# Patient Record
Sex: FEMALE | Race: WHITE | URBAN - METROPOLITAN AREA
[De-identification: names, ages, dates, MRNs, and addresses within clinical notes are randomized per-mention and may not be internally consistent; named-entity substitution may affect disease eponyms.]

---

## 2017-07-12 ENCOUNTER — OFFICE VISIT (OUTPATIENT)
Dept: SLEEP MEDICINE | Facility: CLINIC | Age: 27
End: 2017-07-12
Payer: COMMERCIAL

## 2017-07-12 ENCOUNTER — HOSPITAL ENCOUNTER (OUTPATIENT)
Dept: LAB | Facility: CLINIC | Age: 27
Discharge: HOME | End: 2017-07-12
Attending: PSYCHIATRY & NEUROLOGY | Admitting: PSYCHIATRY & NEUROLOGY
Payer: COMMERCIAL

## 2017-07-12 VITALS
DIASTOLIC BLOOD PRESSURE: 76 MMHG | HEART RATE: 82 BPM | OXYGEN SATURATION: 95 % | SYSTOLIC BLOOD PRESSURE: 115 MMHG | BODY MASS INDEX: 24.09 KG/M2 | HEIGHT: 65 IN | WEIGHT: 144.6 LBS | TEMPERATURE: 98.2 F

## 2017-07-12 DIAGNOSIS — G47.50 PARASOMNIA: ICD-10-CM

## 2017-07-12 DIAGNOSIS — G25.81 RESTLESS LEGS SYNDROME (RLS): ICD-10-CM

## 2017-07-12 DIAGNOSIS — R06.81 CENTRAL APNEA: Primary | ICD-10-CM

## 2017-07-12 LAB — FERRITIN SERPL-MCNC: 25 NG/ML (ref 12–150)

## 2017-07-12 PROCEDURE — 99204 OFFICE O/P NEW MOD 45 MIN: CPT | Performed by: PSYCHIATRY & NEUROLOGY

## 2017-07-12 PROCEDURE — 36415 COLL VENOUS BLD VENIPUNCTURE: CPT | Performed by: PSYCHIATRY & NEUROLOGY

## 2017-07-12 PROCEDURE — 82728 ASSAY OF FERRITIN: CPT | Performed by: PSYCHIATRY & NEUROLOGY

## 2017-07-12 RX ORDER — DROSPIRENONE AND ETHINYL ESTRADIOL 0.03MG-3MG
KIT ORAL
COMMUNITY
Start: 2017-05-07

## 2017-07-12 RX ORDER — METHADONE HCL 100 %
POWDER (GRAM) MISCELLANEOUS
COMMUNITY
Start: 2017-07-05

## 2017-07-12 NOTE — MR AVS SNAPSHOT
After Visit Summary   7/12/2017    Kayla Mitchell    MRN: [id number]           Patient Information     Date Of Birth          1990        Visit Information        Provider Department      7/12/2017 8:00 AM Jose Manuel Gant MD [facility]        Today's Diagnoses     Central apnea    -  1    Parasomnia        Restless legs syndrome (RLS)          Care Instructions      Your BMI is Body mass index is 24.06 kg/(m^2).  Weight management is a personal decision.  If you are interested in exploring weight loss strategies, the following discussion covers the approaches that may be successful. Body mass index (BMI) is one way to tell whether you are at a healthy weight, overweight, or obese. It measures your weight in relation to your height.  A BMI of 18.5 to 24.9 is in the healthy range. A person with a BMI of 25 to 29.9 is considered overweight, and someone with a BMI of 30 or greater is considered obese. More than two-thirds of American adults are considered overweight or obese.  Being overweight or obese increases the risk for further weight gain. Excess weight may lead to heart disease and diabetes.  Creating and following plans for healthy eating and physical activity may help you improve your health.  Weight control is part of healthy lifestyle and includes exercise, emotional health, and healthy eating habits. Careful eating habits lifelong are the mainstay of weight control. Though there are significant health benefits from weight loss, long-term weight loss with diet alone may be very difficult to achieve- studies show long-term success with dietary management in less than 10% of people. Attaining a healthy weight may be especially difficult to achieve in those with severe obesity. In some cases, medications, devices and surgical management might be considered.  What can you do?  If you are overweight or obese and are interested in methods for weight loss, you should discuss  this with your provider.     Consider reducing daily calorie intake by 500 calories.     Keep a food journal.     Avoiding skipping meals, consider cutting portions instead.    Diet combined with exercise helps maintain muscle while optimizing fat loss. Strength training is particularly important for building and maintaining muscle mass. Exercise helps reduce stress, increase energy, and improves fitness. Increasing exercise without diet control, however, may not burn enough calories to loose weight.       Start walking three days a week 10-20 minutes at a time    Work towards walking thirty minutes five days a week     Eventually, increase the speed of your walking for 1-2 minutes at time    In addition, we recommend that you review healthy lifestyles and methods for weight loss available through the National Institutes of Health patient information sites:  http://win.niddk.nih.gov/publications/index.htm    And look into health and wellness programs that may be available through your health insurance provider, employer, local community center, or brittany club.                  Follow-ups after your visit        Your next 10 appointments already scheduled     Jul 13, 2017  8:30 PM CDT   PSG Split with BED 6 SH SLEEP   [facility])    [address] [address]   [phone]            Jul 31, 2017  1:30 PM CDT   Return Sleep Patient with Jose Manuel Gant MD   [facility])    [address] [address]   [phone]              Future tests that were ordered for you today     Open Future Orders        Priority Expected Expires Ordered    Ferritin Routine  9/10/2017 7/12/2017    Comprehensive Sleep Study Routine  1/8/2018 7/12/2017            Who to contact     If you have questions or need follow up information about today's clinic visit or your schedule please  "contact [facility] directly at [phone].  Normal or non-critical lab and imaging results will be communicated to you by MyChart, letter or phone within 4 business days after the clinic has received the results. If you do not hear from us within 7 days, please contact the clinic through MyChart or phone. If you have a critical or abnormal lab result, we will notify you by phone as soon as possible.  Submit refill requests through [employer] or call your pharmacy and they will forward the refill request to us. Please allow 3 business days for your refill to be completed.          Additional Information About Your Visit        [employer] Information     [employer] lets you send messages to your doctor, view your test results, renew your prescriptions, schedule appointments and more. To sign up, go to [email or web address] . Click on \"Log in\" on the left side of the screen, which will take you to the Welcome page. Then click on \"Sign up Now\" on the right side of the page.     You will be asked to enter the access code listed below, as well as some personal information. Please follow the directions to create your username and password.     Your access code is: [email or web address]  Expires: 10/10/2017  8:50 AM     Your access code will  in 90 days. If you need help or a new code, please call your [place] clinic or [phone].        Care EveryWhere ID     This is your Care EveryWhere ID. This could be used by other organizations to access your [place] medical records  [id number]        Your Vitals Were     Pulse Temperature Height Pulse Oximetry BMI (Body Mass Index)       82 98.2  F (36.8  C) (Oral) 1.651 m (5' 5\") 95% 24.06 kg/m2        Blood Pressure from Last 3 Encounters:   17 115/76   16 94/60   16 124/85    Weight from Last 3 Encounters:   17 65.6 kg (144 lb 9.6 oz)   16 62.2 kg (137 lb 3.2 oz)   16 59 kg (130 lb)                 Today's Medication Changes    "       These changes are accurate as of: 7/12/17  8:50 AM.  If you have any questions, ask your nurse or doctor.               These medicines have changed or have updated prescriptions.        Dose/Directions    Methadone HCl Powd   This may have changed:  Another medication with the same name was removed. Continue taking this medication, and follow the directions you see here.        Take 145 mg/ml daily.   Refills:  0       JAMAR 3-0.03 MG per tablet   This may have changed:  Another medication with the same name was removed. Continue taking this medication, and follow the directions you see here.   Generic drug:  drospirenone-ethinyl estradiol        TK 1 T PO QD   Refills:  0         Stop taking these medicines if you haven't already. Please contact your care team if you have questions.     ADDERALL PO           ALLEGRA PO           cloNIDine 0.1 MG/24HR WK patch   Commonly known as:  CATAPRES-TTS1           lidocaine 5 % Patch   Commonly known as:  LIDODERM           pregabalin 50 MG capsule   Commonly known as:  LYRICA           TIZANIDINE HCL PO                    Primary Care Provider Office Phone # Fax #    Estela Van [phone]       [facility] [address] [facility] [address]   [facility] [address]        Equal Access to Services     [facility] : Hadii aad ku hadasho Soomaali, waaxda luqadaha, qaybta kaalmada adeegyada, waxay dennis etienne. So [facility] [phone].    ATENCIÓN: Si habla español, tiene a bhat disposición servicios gratuitos de asistencia lingüística. Nannette al [phone].    We comply with applicable federal civil rights laws and [place] laws. We do not discriminate on the basis of race, color, national origin, age, disability sex, sexual orientation or gender identity.            Thank you!     Thank you for choosing [facility]  for your care. Our goal is always to provide you with excellent care. Hearing back from our  patients is one way we can continue to improve our services. Please take a few minutes to complete the written survey that you may receive in the mail after your visit with us. Thank you!             Your Updated Medication List - Protect others around you: Learn how to safely use, store and throw away your medicines at www.disposemymeds.org.          This list is accurate as of: 7/12/17  8:50 AM.  Always use your most recent med list.                   Brand Name Dispense Instructions for use Diagnosis    CLONAZEPAM PO      Take 1 mg by mouth 3 times daily        Methadone HCl Powd      Take 145 mg/ml daily.        RITALIN PO      Take 5 mg by mouth 2 times daily        JAMAR 3-0.03 MG per tablet   Generic drug:  drospirenone-ethinyl estradiol      TK 1 T PO QD

## 2017-07-12 NOTE — PATIENT INSTRUCTIONS

## 2017-07-12 NOTE — PROGRESS NOTES
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      Complicated by methadone subsequently prescribed for opioid dependency (from prescription dilaudid) and 3mg evening doses of clonazepam for vivid frightening dreams which is incidentally consistent with narcolepsy.  Also complicated as she is already on stimulants in particular 5-10mg of methylphenidate.  The plan is for her to go up on that to treat her ADHD.      Of note she does not have cataplexy history.      Discussed first steps:    We will arrange a inlab PSG looking for sleep disordered breathing (most likely opioid induced centrals) and treat if relevant. Will hold off on MSLT for now.      In the meanwhile she will try to go down on her clonazepam with her psychiatrist.      Once clonazepam is minimize, methylphenidate is optimized and any central apnea is treated we will reassess.      At that point we may consider: actigraphy, MSLT or possibly empiric therapy such as modafinil, or alternative hypersomnia therapy such as clarithromycin or tcDCS (if co-morbid mood issues and cleared by Dr Reid).     We discussed the critical importance of never driving if tired or sleepy.      45 minutes were spent with this patient greater than 50% in counsiling and coordination of care.

## 2017-07-12 NOTE — NURSING NOTE
"Chief Complaint   Patient presents with     Sleep Problem     Tired all the time, difficulty waking up in the morning       Initial /76  Pulse 82  Temp 98.2  F (36.8  C) (Oral)  Ht 1.651 m (5' 5\")  Wt 65.6 kg (144 lb 9.6 oz)  SpO2 95%  BMI 24.06 kg/m2 Estimated body mass index is 24.06 kg/(m^2) as calculated from the following:    Height as of this encounter: 1.651 m (5' 5\").    Weight as of this encounter: 65.6 kg (144 lb 9.6 oz).  Medication Reconciliation: complete   Neck 33cm  ESS 17/24  Marley Kerr MA      "

## 2017-07-13 ENCOUNTER — THERAPY VISIT (OUTPATIENT)
Dept: SLEEP MEDICINE | Facility: CLINIC | Age: 27
End: 2017-07-13
Payer: COMMERCIAL

## 2017-07-13 DIAGNOSIS — G47.50 PARASOMNIA: ICD-10-CM

## 2017-07-13 DIAGNOSIS — R06.81 CENTRAL APNEA: ICD-10-CM

## 2017-07-13 PROCEDURE — 95810 POLYSOM 6/> YRS 4/> PARAM: CPT | Performed by: PSYCHIATRY & NEUROLOGY

## 2017-07-13 NOTE — MR AVS SNAPSHOT
After Visit Summary   7/13/2017    Kayla Mitchell    MRN: [id number]           Patient Information     Date Of Birth          1990        Visit Information        Provider Department      7/13/2017 8:30 PM BED 6 SH SLEEP [facility]        Care Instructions    [facility]    1. Your sleep study will be reviewed by a sleep physician within the next few days.     2. Please follow up in the sleep clinic as scheduled, or, make an appointment with your sleep provider to be seen within two weeks to discuss the results of the sleep study.    3. If you have any questions or problems with your treatment plan, please contact your sleep clinic provider at [phone] to further manage your condition.    4. Please review your attached medication list, and, at your follow-up appointment advise your sleep clinic provider about any changes.    5. Go to [email or web address] for more information about your sleep problems.    LASHAWN Berger  July 14, 2017                Follow-ups after your visit        Your next 10 appointments already scheduled     Jul 31, 2017  1:30 PM CDT   Return Sleep Patient with Jose Manuel Gant MD   [facility] ([facility])    [address] [place] [address] [phone]              Who to contact     If you have questions or need follow up information about today's clinic visit or your schedule please contact [facility] directly at [phone].  Normal or non-critical lab and imaging results will be communicated to you by MyChart, letter or phone within 4 business days after the clinic has received the results. If you do not hear from us within 7 days, please contact the clinic through MyChart or phone. If you have a critical or abnormal lab result, we will notify you by phone as soon as possible.  Submit refill requests through [employer] or call  "your pharmacy and they will forward the refill request to us. Please allow 3 business days for your refill to be completed.          Additional Information About Your Visit        MyChart Information     [email or web address] lets you send messages to your doctor, view your test results, renew your prescriptions, schedule appointments and more. To sign up, go to [email or web address] . Click on \"Log in\" on the left side of the screen, which will take you to the Welcome page. Then click on \"Sign up Now\" on the right side of the page.     You will be asked to enter the access code listed below, as well as some personal information. Please follow the directions to create your username and password.     Your access code is: [email or web address]  Expires: 10/10/2017  8:50 AM     Your access code will  in 90 days. If you need help or a new code, please call your [place] clinic or [phone].        Care EveryWhere ID     This is your Care EveryWhere ID. This could be used by other organizations to access your [place] medical records  [id number]         Blood Pressure from Last 3 Encounters:   17 115/76   16 94/60   16 124/85    Weight from Last 3 Encounters:   17 65.6 kg (144 lb 9.6 oz)   16 62.2 kg (137 lb 3.2 oz)   16 59 kg (130 lb)              Today, you had the following     No orders found for display       Primary Care Provider Office Phone # Fax #    Terrie Copelandberry [phone]       [address] [place] [facility] [place] [address]        Equal Access to Services     [facility] : Hadii gonzalez devine Soraissa, waaxda luqadaha, qaybta kaalmada adejovi, geovany ingram . So [facility] [phone].    ATENCIÓN: Si habla español, tiene a bhat disposición servicios gratuitos de asistencia lingüística. Llame al [phone].    We comply with applicable federal civil rights laws and [place] laws. We do not discriminate on the " basis of race, color, national origin, age, disability sex, sexual orientation or gender identity.            Thank you!     Thank you for choosing [facility]  for your care. Our goal is always to provide you with excellent care. Hearing back from our patients is one way we can continue to improve our services. Please take a few minutes to complete the written survey that you may receive in the mail after your visit with us. Thank you!             Your Updated Medication List - Protect others around you: Learn how to safely use, store and throw away your medicines at www.disposemymeds.org.          This list is accurate as of: 7/13/17 11:59 PM.  Always use your most recent med list.                   Brand Name Dispense Instructions for use Diagnosis    CLONAZEPAM PO      Take 1 mg by mouth 3 times daily        Methadone HCl Powd      Take 145 mg/ml daily.        RITALIN PO      Take 5 mg by mouth 2 times daily        JAMAR 3-0.03 MG per tablet   Generic drug:  drospirenone-ethinyl estradiol      TK 1 T PO QD

## 2017-07-14 NOTE — PROCEDURES
" SLEEP STUDY INTERPRETATION  POLYSOMNOGRAPHY REPORT      Patient: Kayla Mitchell  YOB: 1990  Study Date: 7/13/2017  MRN: [id number]  Referring Provider: Self  Ordering Provider: Jose Manuel Gant MD    Indications for Polysomnography: The patient is a 27 y old Female who is 5' 5\" and weighs 144.0 lbs.  Her BMI is 24.0, [place] sleepiness scale 18.0 and neck size is 33.0.  Relevant medical history includes EDS, opioid use, ? central apneas. A diagnostic polysomnogram was performed to evaluate for sleep apnea.    Polysomnogram Data:  A full night polysomnogram recorded the standard physiologic parameters including EEG, EOG, EMG, ECG, nasal and oral airflow.  Respiratory parameters of chest and abdominal movements were recorded with respiratory inductance plethysmography.  Oxygen saturation was recorded by pulse oximetry.      Sleep Architecture: Sleep fragmentation  The total recording time of the polysomnogram was 428.7 minutes.  The total sleep time was 363.0 minutes.  Sleep latency was 23.1 minutes.  REM latency was 171.5 minutes.  Arousal index was 18.2 arousals per hour.  Sleep efficiency was 84.7%.  Wake after sleep onset was 2.0 minutes.  The patient spent 2.3% of total sleep time in Stage N1, 61.0% in Stage N2, 25.2% in Stages N3, and 11.4% in REM.  Time in REM supine was 0 minutes.    Respiration: The patient did not demonstrate clinically significant sleep disordered breathing.  Of note, this was a good study but did not include REM supine.      Events - The polysomnogram revealed a presence of - obstructive, 3 central, and - mixed apneas resulting in an apnea index of 0.5 events per hour.  There were - hypopneas resulting in a hypopnea index of - events per hour.  The combined apnea/hypopnea index was 0.5 events per hour.  The REM AHI was 2.9 events per hour.  The supine AHI was - events per hour.  The RERA index was 0.7 events per hour.   The RDI was 1.2 events per hour.    Snoring - was " reported as absent.    Respiratory rate and pattern - was notable for normal respiratory rate and pattern.    Sustained Sleep Associated Hypoventilation - Transcutaneous carbon dioxide monitoring was not used.    Sleep Associated Hypoxemia - (Greater than 5 minutes O2 sat below 89%) was not present.  Baseline oxygen saturation was 97.9%. Lowest oxygen saturation was 90.0%.  Time spent less than or equal to 88% was 0 minutes.  Time spent less than or equal to 89% was 0 minutes.  1.2 0.7 0.5     Movement Activity: The patient had elevated motor activity during both NREM (PLMs) and REM (REM sleep without atonia) sleep.      Periodic Limb Activity - There were 114 PLMs during the entire study. The PLM index was 18.8 movements per hour.  The PLM Arousal Index was 3.3 per hour.    REM EMG Activity - Excessive transient muscle activity was present.    Nocturnal Behavior - Abnormal sleep related behaviors were not noted.    Bruxism - None apparent.    Cardiac Summary: Sinus, intermittent tachycardia  The average pulse rate was 64.2 bpm.  The minimum pulse rate was 50.9 bpm while the maximum pulse rate was 101.8 bpm.     Assessment/Recommendations:     Hypersomnia etiology unknown.  o The patient had elevated motor activity during both NREM (PLMs) and REM (REM sleep without atonia) sleep.  Clinical correlation required.   - Treatment of PLMs (dopaminergic agents or delta-2 ligands) should be targeted at patients who either have wakeful motor restlessness or those in whom there is a high clinical suspicion of periodic limb movement disorder and not for elevated PLMs alone.  - Recommend screening for dream enactment and if present consider diagnosis of RBD.  Of note RBD is noted in the setting of orexin based hypersomnia syndromes however other features of orexin disorders such as shortened REM latency were not noted.   o The patient did not demonstrate clinically significant sleep disordered breathing.  Of note, this was a  good study but did not include REM supine.    - Patient may be reassured that they do not have clinically significant sleep disordered breathing if they sleep lateral.      If the patient does sleep supine at home could consider repeat study with emphasis on supine sleeping.  o Recommend the patient optimize the duration and circadian timing of sleep.   o Minimize sedating medications if clinically appropriate.  o If the patient is still excessively sleepy despite these measures could pursue a hypersomnia investigation.    Advise regarding the risks of drowsy driving.    Suggest optimizing sleep schedule and avoiding sleep deprivation.    Diagnostic Code(s): G47.9      Jose Manuel Gant MD 7-14-17

## 2017-07-14 NOTE — PATIENT INSTRUCTIONS
[facility]    1. Your sleep study will be reviewed by a sleep physician within the next few days.     2. Please follow up in the sleep clinic as scheduled, or, make an appointment with your sleep provider to be seen within two weeks to discuss the results of the sleep study.    3. If you have any questions or problems with your treatment plan, please contact your sleep clinic provider at [phone] to further manage your condition.    4. Please review your attached medication list, and, at your follow-up appointment advise your sleep clinic provider about any changes.    5. Go to [email or web address] for more information about your sleep problems.    LASHAWN Berger  July 14, 2017

## 2017-07-31 ENCOUNTER — OFFICE VISIT (OUTPATIENT)
Dept: SLEEP MEDICINE | Facility: CLINIC | Age: 27
End: 2017-07-31
Payer: COMMERCIAL

## 2017-07-31 VITALS
SYSTOLIC BLOOD PRESSURE: 127 MMHG | HEART RATE: 78 BPM | HEIGHT: 65 IN | WEIGHT: 146.8 LBS | OXYGEN SATURATION: 98 % | BODY MASS INDEX: 24.46 KG/M2 | DIASTOLIC BLOOD PRESSURE: 85 MMHG

## 2017-07-31 DIAGNOSIS — G47.10 HYPERSOMNIA: Primary | ICD-10-CM

## 2017-07-31 PROCEDURE — 99214 OFFICE O/P EST MOD 30 MIN: CPT | Performed by: PSYCHIATRY & NEUROLOGY

## 2017-07-31 NOTE — PROGRESS NOTES
Follow up result of PSG for EDS.     No sleep disordered breathing despite high doses of methadone.      Does have increased motor activity in NREM and REM sleep.  Already on 3mg of clonazepam.  Some dream enactment etiology uncertain (possible orexin disorder?) advised starting melatonin 3mg po qhs.     In terms of her EDS she is modestly better on methylphenidate 10mg bid then 5mg bid.  She previously was on higher doses of adderal but had to stop due to anxiety.  Complicating the issue are high doses of methadone.      Discussed our options at this point.    -hold off of clonazepam (would not stop methadone) and stimulants for 3 weeks of actigraphy, sleep extension, and PSG/MSLT testing.   -switch from methylphenidate to modafinil   -higher doses of methylpheidate  -some alternative EDS therapy: clarithromycin, flumazenil (may worsen anxiety).     Very hesitant to make any of these changes at this time due to her fragil anxiety troubles.      I suspect that underneath it all she may very well have a hypersomnia syndrome but difficulty to identify ideal therapy as sedating medications are confusing the issue and even if we gave her the right medication at the right dose for her it is uncertain she would be feeling better enough to let us know.     Discussed her situation and the uncertainty ultimately we decided that she was going to talk to her psychiatrist about going down on her clonazepam and seeing if that helps her alertness. Once she is minimized on the clonazepam I would be willing to arrange for an MSLT because if she does indeed have narcolepsy by MSLT criteria that may be helpful in pursuing management.  Once challenge however will be that she will certainly need to stay on methadone for the entire study which is of course sedating.      Most important issue, again discussed today is the importance of never driving if tired or sleepy.      All questions were answered.    It is a great privilege being  asked to participate in this patients care.      25 minutes were spent with this patient greater than 50% in counsiling and coordination of care.

## 2017-07-31 NOTE — MR AVS SNAPSHOT
After Visit Summary   7/31/2017    Kayla Mitchell    MRN: [id number]           Patient Information     Date Of Birth          1990        Visit Information        Provider Department      7/31/2017 1:30 PM Jose Manuel Gant MD [facility]        Today's Diagnoses     Hypersomnia    -  1      Care Instructions      Your BMI is Body mass index is 24.43 kg/(m^2).  Weight management is a personal decision.  If you are interested in exploring weight loss strategies, the following discussion covers the approaches that may be successful. Body mass index (BMI) is one way to tell whether you are at a healthy weight, overweight, or obese. It measures your weight in relation to your height.  A BMI of 18.5 to 24.9 is in the healthy range. A person with a BMI of 25 to 29.9 is considered overweight, and someone with a BMI of 30 or greater is considered obese. More than two-thirds of American adults are considered overweight or obese.  Being overweight or obese increases the risk for further weight gain. Excess weight may lead to heart disease and diabetes.  Creating and following plans for healthy eating and physical activity may help you improve your health.  Weight control is part of healthy lifestyle and includes exercise, emotional health, and healthy eating habits. Careful eating habits lifelong are the mainstay of weight control. Though there are significant health benefits from weight loss, long-term weight loss with diet alone may be very difficult to achieve- studies show long-term success with dietary management in less than 10% of people. Attaining a healthy weight may be especially difficult to achieve in those with severe obesity. In some cases, medications, devices and surgical management might be considered.  What can you do?  If you are overweight or obese and are interested in methods for weight loss, you should discuss this with your provider.     Consider reducing daily  calorie intake by 500 calories.     Keep a food journal.     Avoiding skipping meals, consider cutting portions instead.    Diet combined with exercise helps maintain muscle while optimizing fat loss. Strength training is particularly important for building and maintaining muscle mass. Exercise helps reduce stress, increase energy, and improves fitness. Increasing exercise without diet control, however, may not burn enough calories to loose weight.       Start walking three days a week 10-20 minutes at a time    Work towards walking thirty minutes five days a week     Eventually, increase the speed of your walking for 1-2 minutes at time    In addition, we recommend that you review healthy lifestyles and methods for weight loss available through the National Institutes of Health patient information sites:  http://win.niddk.nih.gov/publications/index.htm    And look into health and wellness programs that may be available through your health insurance provider, employer, local community center, or brittany club.                  Follow-ups after your visit        Who to contact     If you have questions or need follow up information about today's clinic visit or your schedule please contact [facility] directly at [phone].  Normal or non-critical lab and imaging results will be communicated to you by MyChart, letter or phone within 4 business days after the clinic has received the results. If you do not hear from us within 7 days, please contact the clinic through [employer] or phone. If you have a critical or abnormal lab result, we will notify you by phone as soon as possible.  Submit refill requests through [employer] or call your pharmacy and they will forward the refill request to us. Please allow 3 business days for your refill to be completed.          Additional Information About Your Visit        [employer] Information     [employer] lets you send messages to your doctor, view your test results, renew  "your prescriptions, schedule appointments and more. To sign up, go to [email or web address] . Click on \"Log in\" on the left side of the screen, which will take you to the Welcome page. Then click on \"Sign up Now\" on the right side of the page.     You will be asked to enter the access code listed below, as well as some personal information. Please follow the directions to create your username and password.     Your access code is: [email or web address]  Expires: 10/10/2017  8:50 AM     Your access code will  in 90 days. If you need help or a new code, please call your [facility] clinic or [phone].        Care EveryWhere ID     This is your Care EveryWhere ID. This could be used by other organizations to access your [facility] medical records  [id number]        Your Vitals Were     Pulse Height Pulse Oximetry BMI (Body Mass Index)          78 1.651 m (5' 5\") 98% 24.43 kg/m2         Blood Pressure from Last 3 Encounters:   17 127/85   17 115/76   16 94/60    Weight from Last 3 Encounters:   17 66.6 kg (146 lb 12.8 oz)   17 65.6 kg (144 lb 9.6 oz)   16 62.2 kg (137 lb 3.2 oz)              Today, you had the following     No orders found for display       Primary Care Provider Office Phone # Fax #    Terrie Espino [phone]       [address] [place]  [facility] [address]        Equal Access to Services     [facility] : Hadii gonzalez Khoury, watessda emani, qaybapple garibayalgeovany leigh. So [facility] [phone].    ATENCIÓN: Si habla español, tiene a bhat disposición servicios gratuitos de asistencia lingüística. Nannette al [phone].    We comply with applicable federal civil rights laws and [place] laws. We do not discriminate on the basis of race, color, national origin, age, disability sex, sexual orientation or gender identity.            Thank you!     Thank you for choosing " [facility]  for your care. Our goal is always to provide you with excellent care. Hearing back from our patients is one way we can continue to improve our services. Please take a few minutes to complete the written survey that you may receive in the mail after your visit with us. Thank you!             Your Updated Medication List - Protect others around you: Learn how to safely use, store and throw away your medicines at www.disposemymeds.org.          This list is accurate as of: 7/31/17  3:38 PM.  Always use your most recent med list.                   Brand Name Dispense Instructions for use Diagnosis    CLONAZEPAM PO      Take 1 mg by mouth 3 times daily        Methadone HCl Powd      Take 145 mg/ml daily.        RITALIN PO      Take 5 mg by mouth 2 times daily        JAMAR 3-0.03 MG per tablet   Generic drug:  drospirenone-ethinyl estradiol      TK 1 T PO QD

## 2017-07-31 NOTE — NURSING NOTE
"Chief Complaint   Patient presents with     Study Results     Follow up psg results       Initial /85  Pulse 78  Ht 1.651 m (5' 5\")  Wt 66.6 kg (146 lb 12.8 oz)  SpO2 98%  BMI 24.43 kg/m2 Estimated body mass index is 24.43 kg/(m^2) as calculated from the following:    Height as of this encounter: 1.651 m (5' 5\").    Weight as of this encounter: 66.6 kg (146 lb 12.8 oz).  Medication Reconciliation: complete   Marley Kerr MA      "

## 2017-07-31 NOTE — PATIENT INSTRUCTIONS
